# Patient Record
Sex: MALE
[De-identification: names, ages, dates, MRNs, and addresses within clinical notes are randomized per-mention and may not be internally consistent; named-entity substitution may affect disease eponyms.]

---

## 2021-03-27 ENCOUNTER — NURSE TRIAGE (OUTPATIENT)
Dept: OTHER | Facility: CLINIC | Age: 81
End: 2021-03-27

## 2021-03-27 NOTE — TELEPHONE ENCOUNTER
Brief description of triage: see below. Triage indicates for patient to be seen in the next 24 hours. Care advice provided, patient verbalizes understanding; denies any other questions or concerns; instructed to call back for any new or worsening symptoms. This triage is a result of a call to [address] [address]. Please do not respond to the triage nurse through this encounter. Any subsequent communication should be directly with the patient. Reason for Disposition   Can't remove tick's head that was broken off in the skin (after trying Care Advice)    Answer Assessment - Initial Assessment Questions  1. TYPE of TICK: \"Is it a wood tick or a deer tick? \" If unsure, ask: \"What size was the tick? \" \"Did it look more like a watermelon seed or a poppy seed? \"      Unsure what type. Looked the size of a poppy seed. 2. LOCATION: \"Where is the tick bite located? \"       Bottom of his neck on the right. 3. ONSET: \"How long do you think the tick was attached before you removed it? \" (Hours or days)       3 days ago. 4. TETANUS: \"When was the last tetanus booster? \"       Unsure. 5. PREGNANCY: \"Is there any chance you are pregnant? \" \"When was your last menstrual period? \"      N/a    Protocols used: TICK BITE-ADULT-